# Patient Record
Sex: FEMALE | Race: BLACK OR AFRICAN AMERICAN | URBAN - METROPOLITAN AREA
[De-identification: names, ages, dates, MRNs, and addresses within clinical notes are randomized per-mention and may not be internally consistent; named-entity substitution may affect disease eponyms.]

---

## 2021-08-27 ENCOUNTER — OFFICE VISIT (OUTPATIENT)
Dept: URBAN - METROPOLITAN AREA CLINIC 10 | Facility: CLINIC | Age: 61
End: 2021-08-27
Payer: COMMERCIAL

## 2021-08-27 DIAGNOSIS — H04.123 TEAR FILM INSUFFICIENCY OF BILATERAL LACRIMAL GLANDS: Primary | ICD-10-CM

## 2021-08-27 DIAGNOSIS — H43.393 OTHER VITREOUS OPACITIES, BILATERAL: ICD-10-CM

## 2021-08-27 DIAGNOSIS — Z98.890 OTHER SPECIFIED POSTPROCEDURAL STATES: ICD-10-CM

## 2021-08-27 DIAGNOSIS — Z83.511 FAMILY HISTORY OF GLAUCOMA: ICD-10-CM

## 2021-08-27 DIAGNOSIS — H52.4 PRESBYOPIA: ICD-10-CM

## 2021-08-27 DIAGNOSIS — H40.013 OPEN ANGLE WITH BORDERLINE FINDINGS, LOW RISK, BILATERAL: ICD-10-CM

## 2021-08-27 DIAGNOSIS — H25.813 COMBINED FORMS OF AGE-RELATED CATARACT, BILATERAL: ICD-10-CM

## 2021-08-27 PROCEDURE — 92134 CPTRZ OPH DX IMG PST SGM RTA: CPT | Performed by: OPTOMETRIST

## 2021-08-27 PROCEDURE — 99204 OFFICE O/P NEW MOD 45 MIN: CPT | Performed by: OPTOMETRIST

## 2021-08-27 PROCEDURE — 76514 ECHO EXAM OF EYE THICKNESS: CPT | Performed by: OPTOMETRIST

## 2021-08-27 PROCEDURE — 92133 CPTRZD OPH DX IMG PST SGM ON: CPT | Performed by: OPTOMETRIST

## 2021-08-27 ASSESSMENT — INTRAOCULAR PRESSURE
OS: 14
OD: 16

## 2021-08-27 ASSESSMENT — VISUAL ACUITY
OS: 20/30
OD: 20/30

## 2021-08-27 NOTE — IMPRESSION/PLAN
Impression: Combined forms of age-related cataract, bilateral: H25.813. Plan: Discussed condition and findings with patient. Cataracts are moderate and visually significant/ effecting ADLs and vision. Educated patient that changing glasses will not completely improve vision. Discussed risk / benefit of surgery with patient. Pt defers sx at this time. Will plan to monitor annually, sooner if patient notices changes/ worsening in vision.

## 2021-08-27 NOTE — IMPRESSION/PLAN
Impression: Family history of glaucoma: Z83.511. Plan: (+) FHx, mother went blind. See plan H40.013.

## 2021-08-27 NOTE — IMPRESSION/PLAN
Impression: Other vitreous opacities, bilateral: H43.393. Plan: Educated patient on process of vitreous syneresis. Retina is attached 360 degrees with no signs of any retinal holes, tears, or detachments observed on today's dilated examination. Pt to RTC ASAP if increase in floaters, flashes, or curtain or veil taking away vision.

## 2021-08-27 NOTE — IMPRESSION/PLAN
Impression: Open angle with borderline findings, low risk, bilateral: H40.013. Plan: Thin pachs 2' Lasik OU. RNFL shows no signs of glaucomatous thinning. Recommend monitoring yearly 2' nerve appearance and (+) FHx. RTC 1 year HVF 24-2, RNFL/GCC, IOP.